# Patient Record
Sex: FEMALE | Race: WHITE
[De-identification: names, ages, dates, MRNs, and addresses within clinical notes are randomized per-mention and may not be internally consistent; named-entity substitution may affect disease eponyms.]

---

## 2021-04-06 ENCOUNTER — HOSPITAL ENCOUNTER (EMERGENCY)
Dept: HOSPITAL 96 - M.ERS | Age: 66
Discharge: HOME | End: 2021-04-06
Payer: MEDICARE

## 2021-04-06 VITALS — WEIGHT: 260.01 LBS | HEIGHT: 67 IN | BODY MASS INDEX: 40.81 KG/M2

## 2021-04-06 VITALS — SYSTOLIC BLOOD PRESSURE: 175 MMHG | DIASTOLIC BLOOD PRESSURE: 94 MMHG

## 2021-04-06 DIAGNOSIS — M79.7: ICD-10-CM

## 2021-04-06 DIAGNOSIS — Z88.2: ICD-10-CM

## 2021-04-06 DIAGNOSIS — E11.9: ICD-10-CM

## 2021-04-06 DIAGNOSIS — Z88.0: ICD-10-CM

## 2021-04-06 DIAGNOSIS — N39.0: Primary | ICD-10-CM

## 2021-04-06 DIAGNOSIS — I10: ICD-10-CM

## 2021-04-06 LAB
BACTERIA-REFLEX: (no result) /HPF
BILIRUB UR-MCNC: NEGATIVE MG/DL
COLOR UR: YELLOW
KETONES UR STRIP-MCNC: NEGATIVE MG/DL
PROT UR QL STRIP: (no result)
RBC # UR STRIP: (no result) /UL
RBC #/AREA URNS HPF: (no result) /HPF (ref 0–2)
SP GR UR STRIP: 1.01 (ref 1–1.03)
SQUAMOUS: (no result) /LPF (ref 0–3)
URINE CLARITY: CLEAR
URINE GLUCOSE-RANDOM: (no result)
URINE LEUKOCYTES-REFLEX: (no result)
URINE NITRITE-REFLEX: NEGATIVE
UROBILINOGEN UR STRIP-ACNC: 0.2 E.U./DL (ref 0.2–1)

## 2021-08-01 ENCOUNTER — HOSPITAL ENCOUNTER (INPATIENT)
Dept: HOSPITAL 96 - M.ERS | Age: 66
LOS: 2 days | Discharge: HOME HEALTH | DRG: 177 | End: 2021-08-03
Attending: INTERNAL MEDICINE | Admitting: INTERNAL MEDICINE
Payer: MEDICARE

## 2021-08-01 VITALS — SYSTOLIC BLOOD PRESSURE: 159 MMHG | DIASTOLIC BLOOD PRESSURE: 88 MMHG

## 2021-08-01 VITALS — WEIGHT: 252 LBS | HEIGHT: 67.01 IN | BODY MASS INDEX: 39.55 KG/M2

## 2021-08-01 VITALS — DIASTOLIC BLOOD PRESSURE: 56 MMHG | SYSTOLIC BLOOD PRESSURE: 134 MMHG

## 2021-08-01 VITALS — DIASTOLIC BLOOD PRESSURE: 86 MMHG | SYSTOLIC BLOOD PRESSURE: 167 MMHG

## 2021-08-01 VITALS — SYSTOLIC BLOOD PRESSURE: 133 MMHG | DIASTOLIC BLOOD PRESSURE: 49 MMHG

## 2021-08-01 VITALS — SYSTOLIC BLOOD PRESSURE: 146 MMHG | DIASTOLIC BLOOD PRESSURE: 56 MMHG

## 2021-08-01 DIAGNOSIS — B96.20: ICD-10-CM

## 2021-08-01 DIAGNOSIS — I10: ICD-10-CM

## 2021-08-01 DIAGNOSIS — Z88.0: ICD-10-CM

## 2021-08-01 DIAGNOSIS — J96.01: ICD-10-CM

## 2021-08-01 DIAGNOSIS — M79.7: ICD-10-CM

## 2021-08-01 DIAGNOSIS — Z79.899: ICD-10-CM

## 2021-08-01 DIAGNOSIS — N39.0: ICD-10-CM

## 2021-08-01 DIAGNOSIS — U07.1: Primary | ICD-10-CM

## 2021-08-01 DIAGNOSIS — Z88.2: ICD-10-CM

## 2021-08-01 DIAGNOSIS — J12.82: ICD-10-CM

## 2021-08-01 DIAGNOSIS — E87.1: ICD-10-CM

## 2021-08-01 DIAGNOSIS — E11.9: ICD-10-CM

## 2021-08-01 LAB
ABSOLUTE BASOPHILS: 0 THOU/UL (ref 0–0.2)
ABSOLUTE EOSINOPHILS: 0 THOU/UL (ref 0–0.7)
ABSOLUTE MONOCYTES: 0.5 THOU/UL (ref 0–1.2)
ALBUMIN SERPL-MCNC: 2.5 G/DL (ref 3.4–5)
ALP SERPL-CCNC: 84 U/L (ref 46–116)
ALT SERPL-CCNC: 43 U/L (ref 30–65)
ANION GAP SERPL CALC-SCNC: 9 MMOL/L (ref 7–16)
AST SERPL-CCNC: 31 U/L (ref 15–37)
BACTERIA #/AREA URNS HPF: (no result) /HPF
BASOPHILS NFR BLD AUTO: 0.6 %
BILIRUB SERPL-MCNC: 0.9 MG/DL
BILIRUB UR-MCNC: (no result) MG/DL
BUN SERPL-MCNC: 10 MG/DL (ref 7–18)
CALCIUM SERPL-MCNC: 7.9 MG/DL (ref 8.5–10.1)
CHLORIDE SERPL-SCNC: 98 MMOL/L (ref 98–107)
CO2 SERPL-SCNC: 25 MMOL/L (ref 21–32)
COLOR UR: YELLOW
CREAT SERPL-MCNC: 0.8 MG/DL (ref 0.6–1.3)
EOSINOPHIL NFR BLD: 0.1 %
GLUCOSE SERPL-MCNC: 240 MG/DL (ref 70–99)
GRANULOCYTES NFR BLD MANUAL: 79.5 %
HCT VFR BLD CALC: 42.6 % (ref 37–47)
HGB BLD-MCNC: 14.4 GM/DL (ref 12–15)
KETONES UR STRIP-MCNC: (no result) MG/DL
LYMPHOCYTES # BLD: 0.8 THOU/UL (ref 0.8–5.3)
LYMPHOCYTES NFR BLD AUTO: 12.1 %
MCH RBC QN AUTO: 27.7 PG (ref 26–34)
MCHC RBC AUTO-ENTMCNC: 33.9 G/DL (ref 28–37)
MCV RBC: 81.8 FL (ref 80–100)
MONOCYTES NFR BLD: 7.7 %
MPV: 7.4 FL. (ref 7.2–11.1)
NEUTROPHILS # BLD: 5.3 THOU/UL (ref 1.6–8.1)
NITRITE UR QL STRIP: POSITIVE
NUCLEATED RBCS: 0 /100WBC
PLATELET COUNT*: 250 THOU/UL (ref 150–400)
POTASSIUM SERPL-SCNC: 3.9 MMOL/L (ref 3.5–5.1)
PROT SERPL-MCNC: 6.8 G/DL (ref 6.4–8.2)
PROT UR QL STRIP: (no result)
RBC # BLD AUTO: 5.21 MIL/UL (ref 4.2–5)
RBC # UR STRIP: (no result) /UL
RBC #/AREA URNS HPF: (no result) /HPF (ref 0–2)
RDW-CV: 13.3 % (ref 10.5–14.5)
RENAL EPITHELIAL CELLS: (no result) /LPF
SODIUM SERPL-SCNC: 132 MMOL/L (ref 136–145)
SP GR UR STRIP: 1.02 (ref 1–1.03)
SQUAMOUS: (no result) /LPF (ref 0–3)
URINE CLARITY: CLEAR
URINE GLUCOSE-RANDOM: (no result)
URINE LEUKOCYTES: (no result)
UROBILINOGEN UR STRIP-ACNC: 2 E.U./DL (ref 0.2–1)
WBC # BLD AUTO: 6.7 THOU/UL (ref 4–11)
WBC #/AREA URNS HPF: (no result) /HPF (ref 0–5)

## 2021-08-01 PROCEDURE — XW033H5 INTRODUCTION OF TOCILIZUMAB INTO PERIPHERAL VEIN, PERCUTANEOUS APPROACH, NEW TECHNOLOGY GROUP 5: ICD-10-PCS | Performed by: FAMILY MEDICINE

## 2021-08-02 VITALS — DIASTOLIC BLOOD PRESSURE: 68 MMHG | SYSTOLIC BLOOD PRESSURE: 143 MMHG

## 2021-08-02 VITALS — DIASTOLIC BLOOD PRESSURE: 72 MMHG | SYSTOLIC BLOOD PRESSURE: 166 MMHG

## 2021-08-02 VITALS — DIASTOLIC BLOOD PRESSURE: 42 MMHG | SYSTOLIC BLOOD PRESSURE: 138 MMHG

## 2021-08-02 VITALS — DIASTOLIC BLOOD PRESSURE: 46 MMHG | SYSTOLIC BLOOD PRESSURE: 123 MMHG

## 2021-08-02 VITALS — DIASTOLIC BLOOD PRESSURE: 68 MMHG | SYSTOLIC BLOOD PRESSURE: 160 MMHG

## 2021-08-02 LAB
ANION GAP SERPL CALC-SCNC: 9 MMOL/L (ref 7–16)
BUN SERPL-MCNC: 11 MG/DL (ref 7–18)
CALCIUM SERPL-MCNC: 7.6 MG/DL (ref 8.5–10.1)
CHLORIDE SERPL-SCNC: 103 MMOL/L (ref 98–107)
CO2 SERPL-SCNC: 25 MMOL/L (ref 21–32)
CREAT SERPL-MCNC: 0.8 MG/DL (ref 0.6–1.3)
GLUCOSE SERPL-MCNC: 233 MG/DL (ref 70–99)
HCT VFR BLD CALC: 39.7 % (ref 37–47)
HGB BLD-MCNC: 13.5 GM/DL (ref 12–15)
MCH RBC QN AUTO: 27.8 PG (ref 26–34)
MCHC RBC AUTO-ENTMCNC: 34.1 G/DL (ref 28–37)
MCV RBC: 81.4 FL (ref 80–100)
MPV: 7.8 FL. (ref 7.2–11.1)
PLATELET COUNT*: 269 THOU/UL (ref 150–400)
POTASSIUM SERPL-SCNC: 3.9 MMOL/L (ref 3.5–5.1)
RBC # BLD AUTO: 4.87 MIL/UL (ref 4.2–5)
RDW-CV: 13.2 % (ref 10.5–14.5)
SODIUM SERPL-SCNC: 137 MMOL/L (ref 136–145)
WBC # BLD AUTO: 6 THOU/UL (ref 4–11)

## 2021-08-03 VITALS — SYSTOLIC BLOOD PRESSURE: 139 MMHG | DIASTOLIC BLOOD PRESSURE: 76 MMHG

## 2021-08-03 VITALS — SYSTOLIC BLOOD PRESSURE: 148 MMHG | DIASTOLIC BLOOD PRESSURE: 61 MMHG

## 2021-08-03 LAB
ANION GAP SERPL CALC-SCNC: 8 MMOL/L (ref 7–16)
BUN SERPL-MCNC: 13 MG/DL (ref 7–18)
CALCIUM SERPL-MCNC: 7.9 MG/DL (ref 8.5–10.1)
CHLORIDE SERPL-SCNC: 104 MMOL/L (ref 98–107)
CO2 SERPL-SCNC: 26 MMOL/L (ref 21–32)
CREAT SERPL-MCNC: 0.7 MG/DL (ref 0.6–1.3)
GLUCOSE SERPL-MCNC: 118 MG/DL (ref 70–99)
HCT VFR BLD CALC: 39.8 % (ref 37–47)
HGB BLD-MCNC: 13.3 GM/DL (ref 12–15)
MCH RBC QN AUTO: 27.5 PG (ref 26–34)
MCHC RBC AUTO-ENTMCNC: 33.5 G/DL (ref 28–37)
MCV RBC: 82 FL (ref 80–100)
MPV: 7.7 FL. (ref 7.2–11.1)
PLATELET COUNT*: 292 THOU/UL (ref 150–400)
POTASSIUM SERPL-SCNC: 3.7 MMOL/L (ref 3.5–5.1)
RBC # BLD AUTO: 4.86 MIL/UL (ref 4.2–5)
RDW-CV: 13.1 % (ref 10.5–14.5)
SODIUM SERPL-SCNC: 138 MMOL/L (ref 136–145)
WBC # BLD AUTO: 8.9 THOU/UL (ref 4–11)
